# Patient Record
Sex: FEMALE | URBAN - METROPOLITAN AREA
[De-identification: names, ages, dates, MRNs, and addresses within clinical notes are randomized per-mention and may not be internally consistent; named-entity substitution may affect disease eponyms.]

---

## 2017-12-08 ENCOUNTER — TRANSFERRED RECORDS (OUTPATIENT)
Dept: HEALTH INFORMATION MANAGEMENT | Facility: CLINIC | Age: 42
End: 2017-12-08

## 2017-12-11 ENCOUNTER — MEDICAL CORRESPONDENCE (OUTPATIENT)
Dept: HEALTH INFORMATION MANAGEMENT | Facility: CLINIC | Age: 42
End: 2017-12-11

## 2018-05-24 ENCOUNTER — MEDICAL CORRESPONDENCE (OUTPATIENT)
Dept: HEALTH INFORMATION MANAGEMENT | Facility: CLINIC | Age: 43
End: 2018-05-24

## 2018-06-04 ENCOUNTER — TRANSFERRED RECORDS (OUTPATIENT)
Dept: HEALTH INFORMATION MANAGEMENT | Facility: CLINIC | Age: 43
End: 2018-06-04

## 2018-08-01 DIAGNOSIS — H53.2 DOUBLE VISION: Primary | ICD-10-CM

## 2018-08-03 ENCOUNTER — OFFICE VISIT (OUTPATIENT)
Dept: OPHTHALMOLOGY | Facility: CLINIC | Age: 43
End: 2018-08-03
Attending: OPHTHALMOLOGY
Payer: COMMERCIAL

## 2018-08-03 DIAGNOSIS — H53.2 DOUBLE VISION: ICD-10-CM

## 2018-08-03 DIAGNOSIS — H50.00 ESOTROPIA, MONOCULAR: Primary | ICD-10-CM

## 2018-08-03 PROCEDURE — 92060 SENSORIMOTOR EXAMINATION: CPT | Mod: ZF | Performed by: OPHTHALMOLOGY

## 2018-08-03 PROCEDURE — G0463 HOSPITAL OUTPT CLINIC VISIT: HCPCS | Mod: 25,ZF | Performed by: TECHNICIAN/TECHNOLOGIST

## 2018-08-03 RX ORDER — LANOLIN 72 %
OINTMENT (GRAM) TOPICAL
COMMUNITY
Start: 2011-05-04

## 2018-08-03 ASSESSMENT — TONOMETRY
IOP_METHOD: ICARE
OS_IOP_MMHG: 19
OD_IOP_MMHG: 17

## 2018-08-03 ASSESSMENT — CONF VISUAL FIELD
OD_NORMAL: 1
OS_NORMAL: 1
METHOD: COUNTING FINGERS

## 2018-08-03 ASSESSMENT — VISUAL ACUITY
CORRECTION_TYPE: CONTACTS
OS_CC: 20/20
OS_CC+: -1
METHOD: SNELLEN - LINEAR
OD_CC+: -3
OD_CC: 20/20

## 2018-08-03 ASSESSMENT — SLIT LAMP EXAM - LIDS
COMMENTS: NORMAL
COMMENTS: NORMAL

## 2018-08-03 ASSESSMENT — REFRACTION_WEARINGRX
OS_AXIS: 069
OS_ADD: +1.50
OD_SPHERE: -5.75
OS_SPHERE: -5.75
OD_CYLINDER: +0.75
OS_CYLINDER: +1.50
SPECS_TYPE: PAL
OD_ADD: +1.50
OD_AXIS: 115

## 2018-08-03 ASSESSMENT — EXTERNAL EXAM - RIGHT EYE: OD_EXAM: NORMAL

## 2018-08-03 ASSESSMENT — CUP TO DISC RATIO
OD_RATIO: .3
OS_RATIO: .4

## 2018-08-03 ASSESSMENT — EXTERNAL EXAM - LEFT EYE: OS_EXAM: NORMAL

## 2018-08-03 NOTE — PROGRESS NOTES
1. Monocular diplopia in both eyes potentially due to dry eyes- despite extensive testing I was unable to find a binocular component to her subjective visual disturbance.  Her diplopia/ shadowing did not resolve with prism correction nor monocular closer of either eye.  Because of this I do not see a good indication for strabismus surgery.  Her esotropia is on the border of noticeable at 14 prism diopters but no strong indication for strabismus surgery for reconstructive / appearance purposes.  Trial aggressive lubrication.     2. Esotropia likely congenital with dense right eye suppression on exam today.    Jessica Lubin is a pleasant 42 year old female who presents to my neuro-ophthalmology clinic today referred by Dr. Cassidy for halo vision, headaches, and nausea since October of 2017. She denies keily diplopia. She notices an overlapping of images, with one image with a shadow.  The image is horizontal. It never changes.    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 This is reminiscent of the last time when she had recurrent esotropia. In 1998 they operated on both eyes.  Dr. Don Huang performed surgery [place], [place].  Last time her symptoms of blurred vision resolved with strabismus surgery. She is not currently in prism glasses.     Her monocular shadowing is worse when she is on the computer, more at night driving, and more when she is tired. This week 75% of the time she has halos around images, but she has been more tired lately. Normally around 50-60% of the time she has halo images.    Her visual acuity is 20/20 in both eyes. Her strabismus examination today is notable for esotropia, but prism correction of her strabismus angle does not improve her symptoms. She has strong suppression of her right eye under binocular conditions. Her halo images are present in both eyes under monocular conditions  and she noted no change from her shadowing under monocular conditions and under binocular conditions. 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 Her fundus examination is normal.     In summary, Jessica Lubin is a 42 year old female who presents with monocular halo images likely related to ocular surface dryness. I do not believe that her symptoms can be attributed to her eye misalignment. Though her history of similar symptoms resolving after strabismus surgery is compelling, she does not have resolution of her symptoms with prism correction nor monocular occlusion proving there is no binocular visual disturbance.  The significance of this is that if we proceed to strabismus surgery she will likely still have these problems afterwards though her eyes would appear more aligned.     I would recommend treatment for dry eyes with artificial tears four times a day In both eyes, warm compresses, and eyelid scrubs. I do not have a good answer for why she is having these new headaches and nausea, however I do not believe that this is due to eye misalignment.  Typically headaches from strabismus occur in the context of eye strain with attempted fusion- this patient does not appear to have fusional capacity and she has strong suppression in the right eye under binocular conditions.       Complete documentation of historical and exam elements from today's encounter can be found in the full encounter summary report (not reduplicated in this progress note).  I personally obtained the chief complaint(s) and history of present illness.  I confirmed and edited as necessary the review of systems, past medical/surgical history, family history, social history, and examination findings as documented by others; and I examined the patient myself.  I personally reviewed the relevant tests, images, and reports as documented above.  I formulated and edited as necessary the  assessment and plan and discussed the findings and management plan with the patient and family.  I personally reviewed the ophthalmic test(s) associated with this encounter, agree with the interpretation(s) as documented by the resident/fellow, and have edited the corresponding report(s) as necessary.     MD Anupama Lopez MD, PGY-3

## 2018-08-03 NOTE — MR AVS SNAPSHOT
After Visit Summary   8/3/2018    Jessica Lubin    MRN: [id number]           Patient Information     Date Of Birth          1975        Visit Information        Provider Department      8/3/2018 10:00 AM Enrike [facility]        Today's Diagnoses     Esotropia, monocular    -  1    Double vision           Follow-ups after your visit        Who to contact     Please call your clinic at [phone] to:    Ask questions about your health    Make or cancel appointments    Discuss your medicines    Learn about your test results    Speak to your doctor            Additional Information About Your Visit        MyChart Information     [employer] is an electronic gateway that provides easy, online access to your medical records. With [employer], you can request a clinic appointment, read your test results, renew a prescription or communicate with your care team.     To sign up for [employer] visit the website at [email or web address]   You will be asked to enter the access code listed below, as well as some personal information. Please follow the directions to create your username and password.     Your access code is: [email or web address]  Expires: 9/3/2018 11:45 AM     Your access code will  in 90 days. If you need help or a new code, please contact your [facility] or call [phone] for assistance.        Care EveryWhere ID     This is your Care EveryWhere ID. This could be used by other organizations to access your [facility] medical records  [id number]         Blood Pressure from Last 3 Encounters:   No data found for BP    Weight from Last 3 Encounters:   No data found for Wt              We Performed the Following     IOP Measurement     Sensorimotor        Primary Care Provider Office Phone # Fax #    Zofia J [facility] [phone]       [address] [address]        Equal Access to Services      JESSIE LANGSTON : Hadii aad rita abel Norman, wafrancesda luqadaha, qaybta kaalmada zarialuis albertofrancesco, lauryn brunopitershin garcia . So [facility] [phone].    ATENCIÓN: Si habla español, tiene a olivarez disposición servicios gratuitos de asistencia lingüística. Llame al [phone].    We comply with applicable federal civil rights laws and [place] laws. We do not discriminate on the basis of race, color, national origin, age, disability, sex, sexual orientation, or gender identity.            Thank you!     Thank you for choosing [facility]  for your care. Our goal is always to provide you with excellent care. Hearing back from our patients is one way we can continue to improve our services. Please take a few minutes to complete the written survey that you may receive in the mail after your visit with us. Thank you!             Your Updated Medication List - Protect others around you: Learn how to safely use, store and throw away your medicines at www.disposemymeds.org.          This list is accurate as of 8/3/18 11:59 PM.  Always use your most recent med list.                   Brand Name Dispense Instructions for use Diagnosis    Lanolin Hydrous Oint

## 2018-08-03 NOTE — LETTER
2018    RE: Jessica Lubin  : 1975  MRN: [id number]    Dear Dr. Cassidy,    Thank you for referring your patient, Jessica Lubin, to my neuro-ophthalmology clinic recently.  After a thorough neuro-ophthalmic history and examination, I came to the following conclusions:     1. Monocular diplopia in both eyes potentially due to dry eyes- despite extensive testing I was unable to find a binocular component to her subjective visual disturbance.  Her diplopia/ shadowing did not resolve with prism correction nor monocular closer of either eye.  Because of this I do not see a good indication for strabismus surgery.  Her esotropia is on the border of noticeable at 14 prism diopters but no strong indication for strabismus surgery for reconstructive / appearance purposes.  Trial aggressive lubrication.     2. Esotropia likely congenital with dense right eye suppression on exam today.    Jessica Lubin is a pleasant 42 year old female who presents to my neuro-ophthalmology clinic today referred by Dr. Cassidy for halo vision, headaches, and nausea since 2017. She denies keily diplopia. She notices an overlapping of images, with one image with a shadow.  The image is horizontal. It never changes.    She has a history of strabismus surgery at age 1 and one in  for congenital esotropia (? Left eye only) and recurrent esotropia. This is reminiscent of the last time when she had recurrent esotropia. In  they operated on both eyes.  Dr. Don Huang performed surgery [place], [place].  Last time her symptoms of blurred vision resolved with strabismus surgery. She is not currently in prism glasses.     Her monocular shadowing is worse when she is on the computer, more at night driving, and more when she is tired. This week 75% of the time she has halos around images, but she has been more tired lately. Normally around 50-60% of the time she has halo images.    Her visual acuity is  20/20 in both eyes. Her strabismus examination today is notable for esotropia, but prism correction of her strabismus angle does not improve her symptoms. She has strong suppression of her right eye under binocular conditions. Her halo images are present in both eyes under monocular conditions and she noted no change from her shadowing under monocular conditions and under binocular conditions. 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 Her fundus examination is normal.     In summary, Jessica Lubin is a 42 year old female who presents with monocular halo images likely related to ocular surface dryness. I do not believe that her symptoms can be attributed to her eye misalignment. Though her history of similar symptoms resolving after strabismus surgery is compelling, she does not have resolution of her symptoms with prism correction nor monocular occlusion proving there is no binocular visual disturbance.  The significance of this is that if we proceed to strabismus surgery she will likely still have these problems afterwards though her eyes would appear more aligned.     I would recommend treatment for dry eyes with artificial tears four times a day In both eyes, warm compresses, and eyelid scrubs. I do not have a good answer for why she is having these new headaches and nausea, however I do not believe that this is due to eye misalignment.  Typically headaches from strabismus occur in the context of eye strain with attempted fusion- this patient does not appear to have fusional capacity and she has strong suppression in the right eye under binocular conditions.      Again, thank you for trusting me with the care of your patient.  For further exam details, please feel free to contact our office for additional records.  If you wish to contact me regarding this patient please email me at [email or web address] or give my clinic a call to arrange a phone  conversation.    Sincerely,    Enrike Keith MD  , Neuro-Ophthalmology and Adult Strabismus  Department of Ophthalmology and Visual Neurosciences  [facility]    DX: esotropia, monocular diplopia

## 2018-08-03 NOTE — NURSING NOTE
"Chief Complaints and History of Present Illnesses   Patient presents with     New Patient     referral for neuro-ophthalmology evaluation for double vision     HPI    Symptoms:              Comments:  Referral for strabismus surgery consult.     History of amblyopia treatment when patient was 2-3 years of age.  History of 2 strabismus surgery: first one when patient was 2-3 years of age, one eye only per patient. Second surgery was done in both eyes in 97/98. Patient states both eye muscle surgeries were done for esotropia. 2nd surgery done by Dr. Huang in [place], do not know who did the first surgery. Patient states she has attempted to get records from Dr. Huang in [place], unsuccessful.     Patient states she was referred to Dr. Stover for possible injection for esotropia however that did not happen because of insurance and was referred here instead.     No double vision but more of a \"halo\" vision. Patient states having to strain more to focus which is causing headaches.   Glasses and contacts wearer, no prism that patient is aware of.     RE Ortega 8/3/2018 10:08 AM             "